# Patient Record
Sex: FEMALE | Race: BLACK OR AFRICAN AMERICAN | NOT HISPANIC OR LATINO | URBAN - METROPOLITAN AREA
[De-identification: names, ages, dates, MRNs, and addresses within clinical notes are randomized per-mention and may not be internally consistent; named-entity substitution may affect disease eponyms.]

---

## 2022-04-26 ENCOUNTER — OFFICE VISIT (OUTPATIENT)
Dept: URBAN - METROPOLITAN AREA CLINIC 17 | Facility: CLINIC | Age: 82
End: 2022-04-26

## 2022-04-26 RX ORDER — OLOPATADINE HYDROCHLORIDE 7 MG/ML
SOLUTION OPHTHALMIC
Qty: 0 | Refills: 0 | Status: ACTIVE | COMMUNITY
Start: 1900-01-01

## 2022-04-27 ENCOUNTER — DASHBOARD ENCOUNTERS (OUTPATIENT)
Age: 82
End: 2022-04-27

## 2022-04-28 ENCOUNTER — LAB OUTSIDE AN ENCOUNTER (OUTPATIENT)
Dept: URBAN - METROPOLITAN AREA CLINIC 17 | Facility: CLINIC | Age: 82
End: 2022-04-28

## 2022-04-28 ENCOUNTER — CLAIMS CREATED FROM THE CLAIM WINDOW (OUTPATIENT)
Dept: URBAN - METROPOLITAN AREA CLINIC 17 | Facility: CLINIC | Age: 82
End: 2022-04-28
Payer: MEDICAID

## 2022-04-28 VITALS
SYSTOLIC BLOOD PRESSURE: 115 MMHG | DIASTOLIC BLOOD PRESSURE: 74 MMHG | WEIGHT: 141 LBS | TEMPERATURE: 98 F | HEART RATE: 67 BPM | HEIGHT: 65 IN | BODY MASS INDEX: 23.49 KG/M2

## 2022-04-28 DIAGNOSIS — Z79.01 ANTICOAGULATION ADEQUATE: ICD-10-CM

## 2022-04-28 DIAGNOSIS — I48.0 PAROXYSMAL ATRIAL FIBRILLATION: ICD-10-CM

## 2022-04-28 DIAGNOSIS — R10.33 PERIUMBILICAL ABDOMINAL PAIN: ICD-10-CM

## 2022-04-28 DIAGNOSIS — K76.0 HEPATIC STEATOSIS: ICD-10-CM

## 2022-04-28 DIAGNOSIS — K57.30 DIVERTICULA OF COLON: ICD-10-CM

## 2022-04-28 DIAGNOSIS — R74.8 ELEVATED LIVER ENZYMES: ICD-10-CM

## 2022-04-28 PROBLEM — 282825002: Status: ACTIVE | Noted: 2022-04-28

## 2022-04-28 PROBLEM — 197321007: Status: ACTIVE | Noted: 2022-04-28

## 2022-04-28 PROBLEM — 733657002: Status: ACTIVE | Noted: 2022-04-28

## 2022-04-28 PROBLEM — 365634009: Status: ACTIVE | Noted: 2022-04-28

## 2022-04-28 PROCEDURE — 99204 OFFICE O/P NEW MOD 45 MIN: CPT | Performed by: INTERNAL MEDICINE

## 2022-04-28 RX ORDER — FAMOTIDINE 20 MG/1
1 TABLET AT BEDTIME AS NEEDED TABLET, FILM COATED ORAL ONCE A DAY
Status: ACTIVE | COMMUNITY

## 2022-04-28 RX ORDER — APIXABAN 5 MG/1
AS DIRECTED TABLET, FILM COATED ORAL
Status: ACTIVE | COMMUNITY

## 2022-04-28 RX ORDER — OLOPATADINE HYDROCHLORIDE 7 MG/ML
SOLUTION OPHTHALMIC
Qty: 0 | Refills: 0 | Status: ON HOLD | COMMUNITY
Start: 1900-01-01

## 2022-04-28 RX ORDER — OMEPRAZOLE 40 MG/1
1 CAPSULE 30 MINUTES BEFORE MORNING MEAL CAPSULE, DELAYED RELEASE ORAL ONCE A DAY
Qty: 90 | Refills: 0 | OUTPATIENT
Start: 2022-04-28

## 2022-04-28 NOTE — EXAM-PHYSICAL EXAM
Gen: Alert, oriented, in no distress Heent: no icterus, lips wnl Lungs: bilateral breath sounds CVS: first and second heart sounds Abdomen: full, soft, non tender Ext: no edema Joints: R forearm in a sling after a fall Spine: consistent with age Skin: no rash on exposed areas Neuro: no focal localizing signs

## 2022-04-28 NOTE — HPI-TODAY'S VISIT:
4/28/22 82 yo lady v well preserved. Pt of DR Thornton.  Here for GI complaints. Has afib on Eliquis CT abd pelvis 3/2022 showed hepatic steatosis, diverticulosis. US pelvis wnl.  6/2021 showed wnl CBC, alk phos 111, AST 44, ALT 54.  No etoh use. No personal or fhx of liver disease. NO tattoos. no sexual activity. No h/o hepatitis.  Had a colonoscopy 8 years ago which was normal.  BMs are irregular - goes daily but sometimes hard and straining , no blood in stool. She c/o many years of post prandial abdominal pain. She had GB removal in 4/16/21. Symptoms worsening. Pain is supraumbilical, associated with bloating and feeling of something moving. No heartburn, + burping.  No nsaid or ASA use. Take Eliquis daily for less than one year.  Famotidine is not helping.

## 2022-04-29 LAB
ALBUMIN: 4.1
ALKALINE PHOSPHATASE: 106
ALT (SGPT): 14
AST (SGOT): 22
BILIRUBIN, DIRECT: 0.13
BILIRUBIN, TOTAL: 0.4
HBSAG SCREEN: NEGATIVE
HCV AB: 0.1
HEP A AB, IGM: NEGATIVE
HEP B CORE AB, IGM: NEGATIVE
INTERPRETATION:: (no result)
PROTEIN, TOTAL: 6.5